# Patient Record
Sex: MALE | Race: OTHER | NOT HISPANIC OR LATINO
[De-identification: names, ages, dates, MRNs, and addresses within clinical notes are randomized per-mention and may not be internally consistent; named-entity substitution may affect disease eponyms.]

---

## 2017-07-19 ENCOUNTER — APPOINTMENT (OUTPATIENT)
Dept: OTOLARYNGOLOGY | Facility: CLINIC | Age: 67
End: 2017-07-19

## 2017-07-19 VITALS — SYSTOLIC BLOOD PRESSURE: 136 MMHG | HEART RATE: 75 BPM | DIASTOLIC BLOOD PRESSURE: 88 MMHG | OXYGEN SATURATION: 97 %

## 2017-07-19 RX ORDER — METOPROLOL SUCCINATE 50 MG/1
50 TABLET, EXTENDED RELEASE ORAL
Qty: 30 | Refills: 0 | Status: ACTIVE | COMMUNITY
Start: 2017-01-06

## 2018-05-23 ENCOUNTER — APPOINTMENT (OUTPATIENT)
Dept: OTOLARYNGOLOGY | Facility: CLINIC | Age: 68
End: 2018-05-23
Payer: COMMERCIAL

## 2018-05-23 VITALS
OXYGEN SATURATION: 99 % | HEART RATE: 67 BPM | TEMPERATURE: 98.5 F | DIASTOLIC BLOOD PRESSURE: 81 MMHG | RESPIRATION RATE: 15 BRPM | SYSTOLIC BLOOD PRESSURE: 174 MMHG

## 2018-05-23 PROCEDURE — 92557 COMPREHENSIVE HEARING TEST: CPT

## 2018-05-23 PROCEDURE — 92567 TYMPANOMETRY: CPT

## 2018-05-23 PROCEDURE — 99213 OFFICE O/P EST LOW 20 MIN: CPT | Mod: 25

## 2018-05-23 PROCEDURE — 69210 REMOVE IMPACTED EAR WAX UNI: CPT

## 2018-08-08 ENCOUNTER — APPOINTMENT (OUTPATIENT)
Dept: OTOLARYNGOLOGY | Facility: CLINIC | Age: 68
End: 2018-08-08
Payer: COMMERCIAL

## 2018-08-08 VITALS
RESPIRATION RATE: 17 BRPM | TEMPERATURE: 97.8 F | OXYGEN SATURATION: 99 % | SYSTOLIC BLOOD PRESSURE: 122 MMHG | DIASTOLIC BLOOD PRESSURE: 78 MMHG | HEART RATE: 85 BPM

## 2018-08-08 DIAGNOSIS — H81.43 VERTIGO OF CENTRAL ORIGIN, BILATERAL: ICD-10-CM

## 2018-08-08 PROCEDURE — 99214 OFFICE O/P EST MOD 30 MIN: CPT

## 2018-08-08 PROCEDURE — 92557 COMPREHENSIVE HEARING TEST: CPT

## 2018-08-08 PROCEDURE — 92550 TYMPANOMETRY & REFLEX THRESH: CPT

## 2018-11-27 ENCOUNTER — APPOINTMENT (OUTPATIENT)
Dept: ORTHOPEDIC SURGERY | Facility: CLINIC | Age: 68
End: 2018-11-27
Payer: COMMERCIAL

## 2018-11-27 ENCOUNTER — OTHER (OUTPATIENT)
Age: 68
End: 2018-11-27

## 2018-11-27 DIAGNOSIS — M41.9 SCOLIOSIS, UNSPECIFIED: ICD-10-CM

## 2018-11-27 PROCEDURE — 99214 OFFICE O/P EST MOD 30 MIN: CPT

## 2018-11-27 PROCEDURE — 73030 X-RAY EXAM OF SHOULDER: CPT | Mod: LT

## 2018-11-27 PROCEDURE — 72050 X-RAY EXAM NECK SPINE 4/5VWS: CPT

## 2018-11-27 RX ORDER — POLYETHYLENE GLYCOL 3350 17 G/17G
17 POWDER, FOR SOLUTION ORAL
Qty: 255 | Refills: 0 | Status: COMPLETED | COMMUNITY
Start: 2018-11-21

## 2019-07-22 ENCOUNTER — APPOINTMENT (OUTPATIENT)
Dept: OTOLARYNGOLOGY | Facility: CLINIC | Age: 69
End: 2019-07-22
Payer: COMMERCIAL

## 2019-07-22 VITALS
TEMPERATURE: 99.4 F | SYSTOLIC BLOOD PRESSURE: 104 MMHG | HEART RATE: 71 BPM | OXYGEN SATURATION: 96 % | DIASTOLIC BLOOD PRESSURE: 66 MMHG

## 2019-07-22 PROCEDURE — 69210 REMOVE IMPACTED EAR WAX UNI: CPT

## 2019-07-22 PROCEDURE — 92550 TYMPANOMETRY & REFLEX THRESH: CPT

## 2019-07-22 PROCEDURE — 99213 OFFICE O/P EST LOW 20 MIN: CPT | Mod: 25

## 2019-07-22 PROCEDURE — 92557 COMPREHENSIVE HEARING TEST: CPT

## 2019-07-22 NOTE — PROCEDURE
[Cerumen Impaction] : Cerumen Impaction [Same] : same as the Pre Op Dx. [] : Removal of Cerumen [FreeTextEntry6] : b copious cerumen impaction removed atraumatically with suction

## 2019-07-22 NOTE — HISTORY OF PRESENT ILLNESS
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 no pain or drainage. no trauma or noise exposure. nonsmoker. he feels his hl is moderate in severity and has had it for years. no fh relevant to cc.

## 2019-07-22 NOTE — PHYSICAL EXAM
[de-identified] : b copious cerumen impaction removed atraumatically with suction [Normal] : no masses and lesions seen, face is symmetric

## 2020-10-21 ENCOUNTER — APPOINTMENT (OUTPATIENT)
Dept: OTOLARYNGOLOGY | Facility: CLINIC | Age: 70
End: 2020-10-21
Payer: COMMERCIAL

## 2020-10-21 VITALS
HEART RATE: 73 BPM | SYSTOLIC BLOOD PRESSURE: 124 MMHG | OXYGEN SATURATION: 96 % | DIASTOLIC BLOOD PRESSURE: 78 MMHG | TEMPERATURE: 98 F

## 2020-10-21 PROCEDURE — 99213 OFFICE O/P EST LOW 20 MIN: CPT | Mod: 25

## 2020-10-21 PROCEDURE — 92557 COMPREHENSIVE HEARING TEST: CPT

## 2020-10-21 PROCEDURE — 92550 TYMPANOMETRY & REFLEX THRESH: CPT

## 2020-10-21 PROCEDURE — G0268 REMOVAL OF IMPACTED WAX MD: CPT

## 2020-10-21 PROCEDURE — 69210 REMOVE IMPACTED EAR WAX UNI: CPT

## 2020-10-21 NOTE — PHYSICAL EXAM
[de-identified] : b copious cerumen impaction removed atraumatically with suction [Normal] : no rashes [de-identified] : gait steady

## 2020-10-21 NOTE — HISTORY OF PRESENT ILLNESS
[de-identified] : 69 yo man with h/o b snhl wishes to have his hearing checked as it has been over a year since he has been here. He reports that he feels as if his hearing has worsened slightly. denies tinnitus or vertigo. About to have cataract surgery. He feels his hl has decreased mildly and is not a current ha user.

## 2021-05-10 ENCOUNTER — APPOINTMENT (OUTPATIENT)
Dept: ORTHOPEDIC SURGERY | Facility: CLINIC | Age: 71
End: 2021-05-10
Payer: MEDICARE

## 2021-05-10 VITALS
BODY MASS INDEX: 28.92 KG/M2 | SYSTOLIC BLOOD PRESSURE: 123 MMHG | WEIGHT: 202 LBS | HEIGHT: 70 IN | DIASTOLIC BLOOD PRESSURE: 77 MMHG

## 2021-05-10 DIAGNOSIS — M48.10 ANKYLOSING HYPEROSTOSIS [FORESTIER], SITE UNSPECIFIED: ICD-10-CM

## 2021-05-10 PROCEDURE — 99072 ADDL SUPL MATRL&STAF TM PHE: CPT

## 2021-05-10 PROCEDURE — 99215 OFFICE O/P EST HI 40 MIN: CPT

## 2021-05-10 PROCEDURE — 73630 X-RAY EXAM OF FOOT: CPT | Mod: LT

## 2021-05-10 NOTE — ASSESSMENT
[FreeTextEntry1] : 71-year-old gentleman with left shoulder and left ankle pain.  In his shoulder by report it states that there is mild osteoarthritis glenohumeral joint.  No loss of motion found on exam seemed greater than I would expect if he just had mild arthritis.  I would like to see the x-rays to see if the arthritis looks more advanced than reported.  Otherwise he may have other reasons for the stiffness.  We may consider a steroid injection, physical therapy or further evaluation with an MRI.  His rotator cuff strength was good however so I did not  suspect a significant tear.\par In his ankle I think he may have a chronic anterior tibial tendon rupture.  These can occur with degeneration.  This is causing him to compensate with the EHL which is why his hallux is in dorsiflexion when walking.  He actually walks relatively well.  A large part of his gait issue is more stiffness and what looks consistent with Parkinson's perhaps.  He is going to be seeing a neurologist to rule out Parkinson's.\par I referred him for an MRI of the left ankle to confirm if there is a rupture of the tendon and to assess in further detail.  In the meantime he can start some physical therapy to work on strengthening the foot and ankle and work on his gait.\par I spoke to him about operative and nonoperative treatment for chronic anterior tibial tendon ruptures.  Some patients with a function well and walk well do not need to do surgery.  An ankle-foot orthosis may be helpful to hold the ankle in dorsiflexion so he will not have to compensate with the EHL.  Surgery would involve the tendon transfer versus tendon graft and I would work more as a check rein then to get back good power anterior tibial tendon\par He certainly should see the neurologist first before thinking about any surgery just to clarify what else may be affecting his gait.\par Follow-up in a few weeks and at that time he will also bring in copies of the shoulder and ankle x-rays that were done last week.

## 2021-05-10 NOTE — HISTORY OF PRESENT ILLNESS
[de-identified] : Mr. Connors comes in for f/u fo his LEFT shoulder. I last saw him for LEFT shoulder 2 1/2 yrs ago when it became painful  without any specific injury or inciting event. He has pain and stiffness. \par He states that the shoulder pain is about 6 out of 10 worse lifting his arm or moving it but also hurts at rest.  He had done physical therapy a couple years ago but nothing recently.  He did go for a recent x-ray showing mild osteoarthritis of both glenohumeral joints.   His shoulder pain is getting progressively worse.\par He complains of pain and swelling in the left ankle going on for at least a year.  Sometimes it feels numb.  He has noticed that his hallux sticks up and does not flexes easily.\par Started walking and is worse sitting about 6-7 out of 10 in severity.

## 2021-05-10 NOTE — REASON FOR VISIT
[Follow-Up Visit] : a follow-up visit for [Shoulder Pain] : shoulder pain [FreeTextEntry2] : ankle pain

## 2021-05-10 NOTE — PHYSICAL EXAM
[Slightly Antalgic] : slightly antalgic [Shuffling] : shuffling [Normal RUE] : Right Upper Extremity: No scars, rashes, lesions, ulcers, skin intact [Normal LUE] : Left Upper Extremity: No scars, rashes, lesions, ulcers, skin intact [Normal RLE] : Right Lower Extremity: No scars, rashes, lesions, ulcers, skin intact [Normal LLE] : Left Lower Extremity: No scars, rashes, lesions, ulcers, skin intact [Normal Touch] : sensation intact for touch [Normal] : No swelling, no edema, normal pedal pulses and normal temperature [de-identified] : Shoulders\par Mildly tender glenohumeral joint left shoulder.\par Forward elevation actively and passively is to about 110 to 120 degrees and internal rotation to L5 and about 30 degrees external rotation with his arm at the side.\par Rotator cuff strength is good: 5/5 supraspinatus, internal rotation and external rotation.  5/5 biceps.\par Pain with Neer and Feliciano.\par \par Left foot and ankle\par He does walk with a shuffling gait with stiffness and start up difficulty.  He has other changes that look consistent with Parkinson's including a flat face.\par Mild edema left ankle without ecchymoses or erythema.\par On ambulation his left hallux is dorsiflexed and not touching the ground as it is on the right side.\par Anterior tibial tendon is not palpable over the left anterior ankle and there is tenderness in this region.\par Ankle dorsiflexion strength is about 4+ out of 5 due to compensation with the toe extensors.\par Ankle range of motion is with 5 degrees of dorsiflexion and 25 to 30 degrees plantarflexion.\par Sensation is intact throughout.  Foot is warm with normal capillary refill. [de-identified] : \par X-rays taking at [facility] of bilateral ankles AP and lateral views on May 6, 2021 showed moderate plantar calcaneal spur on the left and unremarkable right\par \par Report on x-rays of bilateral shoulders 2 views AP and axillary taken May 6, 2021 showed mild glenohumeral and AC joint arthritis in both shoulders.  X-rays of the\par \par Left foot weightbearing 3 views taken today show plantar heel spur and accessory navicular.  No acute changes.

## 2021-06-02 ENCOUNTER — APPOINTMENT (OUTPATIENT)
Dept: ORTHOPEDIC SURGERY | Facility: CLINIC | Age: 71
End: 2021-06-02
Payer: MEDICARE

## 2021-06-02 DIAGNOSIS — M19.012 PRIMARY OSTEOARTHRITIS, LEFT SHOULDER: ICD-10-CM

## 2021-06-02 DIAGNOSIS — R29.898 OTHER SYMPTOMS AND SIGNS INVOLVING THE MUSCULOSKELETAL SYSTEM: ICD-10-CM

## 2021-06-02 DIAGNOSIS — M54.2 CERVICALGIA: ICD-10-CM

## 2021-06-02 PROCEDURE — 99215 OFFICE O/P EST HI 40 MIN: CPT

## 2021-06-02 NOTE — REVIEW OF SYSTEMS
[Joint Pain] : joint pain [Joint Stiffness] : joint stiffness [Feeling Weak] : feeling weak [Muscle Weakness] : muscle weakness

## 2021-06-02 NOTE — PHYSICAL EXAM
[Slightly Antalgic] : slightly antalgic [Normal RUE] : Right Upper Extremity: No scars, rashes, lesions, ulcers, skin intact [Shuffling] : shuffling [Normal LUE] : Left Upper Extremity: No scars, rashes, lesions, ulcers, skin intact [Normal RLE] : Right Lower Extremity: No scars, rashes, lesions, ulcers, skin intact [Normal LLE] : Left Lower Extremity: No scars, rashes, lesions, ulcers, skin intact [Normal Touch] : sensation intact for touch [Normal] : No swelling, no edema, normal pedal pulses and normal temperature [de-identified] : Cervical spine\par Stiffness with range of motion.  Forward projection of the head neck.  Pain with extension.\par Tender in the cervical spine.\par \par Left foot and ankle\par He walks with a shuffling gait with stiffness and start up difficulty.  Rigidity\par Mild edema left ankle without ecchymoses or erythema.\par On ambulation his left hallux is dorsiflexed and not touching the ground as it is on the right side.\par Anterior tibial tendon is not palpable over the left anterior ankle and there is tenderness in this region.\par Ankle dorsiflexion strength is about 4+ out of 5 due to compensation with the toe extensors.\par Ankle range of motion is with 5 degrees of dorsiflexion and 25 to 30 degrees plantarflexion.\par Sensation is intact throughout.  Foot is warm with normal capillary refill. [de-identified] : \par X-rays taking at [facility] of bilateral ankles AP and lateral views on May 6, 2021 showed moderate plantar calcaneal spur on the left and unremarkable right\par \par Report on x-rays of bilateral shoulders 2 views AP and axillary taken May 6, 2021 showed mild glenohumeral and AC joint arthritis in both shoulders.  X-rays of the\par \par Left foot weightbearing 3 views taken 5/10/21 showed plantar heel spur and accessory navicular.  No acute changes.\par \par MRI left ankle 5/18/21 showed intact Anterior Tibial Tendon, decreased muscle bulk distal calf and ankle mm and extensive ankle edema.\par \par Limited left ankle ultrasound was done today with comparison to the right and the anterior tibial tendon is visualized although somewhat attenuated compared to the right side\par \par MRI cervical spine was also performed May 18, 2021 showing multilevel degenerative changes most significant at C5-C6 with mild to moderate central stenosis and moderate left and severe right neural foraminal narrowing.  At other levels there is also moderate to severe foraminal narrowing.\par \par MRI of the lumbar spine performed May 18, 2021 also shows multilevel degenerative changes and at L4-L5 there is mild to moderate right and moderate left foraminal narrowing and there is a an L2-L3 disc bulge contacting the right greater than left L3 nerve roots and mild to moderate right neural foraminal stenosis.

## 2021-06-02 NOTE — CONSULT LETTER
[Dear  ___] : Dear  [unfilled], [Consult Letter:] : I had the pleasure of evaluating your patient, [unfilled]. [Please see my note below.] : Please see my note below. [Consult Closing:] : Thank you very much for allowing me to participate in the care of this patient.  If you have any questions, please do not hesitate to contact me. [Sincerely,] : Sincerely, [FreeTextEntry3] : Renee Herrera MD

## 2021-06-02 NOTE — HISTORY OF PRESENT ILLNESS
[de-identified] : Mr. Connors comes in for f/u for his LEFT ankle where he has weakness and swelling.  I thought on exam last visit that he might have an anterior tibial tendon chronic rupture because the tendon was not palpated well on exam.  The MRI shows the intact tendon is intact however but there is significant loss of muscle bulk in the ankle and lower leg.\par He also had MRIs of the cervical and lumbar spines. He is seeing a neurologist next week.\par He has a lot of neck stiffness and neck pain without radiculopathy.

## 2021-06-02 NOTE — ASSESSMENT
[FreeTextEntry1] : 71-year-old gentleman with left shoulder, Cervical  and left ankle pain.  He has multilevel cervical spondylosis greatest at C5-C6 with moderate to severe foraminal narrowing.\par He was given a new prescription for physical therapy to work on his cervical spine and in addition to his gait and left ankle weakness.\par It looks like the anterior tibial tendon is not ruptured but he does seem to have significant weakness in his left foot and ankle compared to the right side.  He is seeing the neurologist next week who will hopefully be able to shed some light on what is going on neurologically.  He has a lot of features that look consistent with Parkinson's.\par Heat and ice as needed.  Tylenol as needed.  He cannot take NSAIDs.\par Follow-up in about 5 weeks.

## 2021-07-07 ENCOUNTER — APPOINTMENT (OUTPATIENT)
Dept: ORTHOPEDIC SURGERY | Facility: CLINIC | Age: 71
End: 2021-07-07
Payer: MEDICARE

## 2021-07-07 DIAGNOSIS — G89.29 PAIN IN LEFT ANKLE AND JOINTS OF LEFT FOOT: ICD-10-CM

## 2021-07-07 DIAGNOSIS — M47.812 SPONDYLOSIS W/OUT MYELOPATHY OR RADICULOPATHY, CERVICAL REGION: ICD-10-CM

## 2021-07-07 DIAGNOSIS — M25.572 PAIN IN LEFT ANKLE AND JOINTS OF LEFT FOOT: ICD-10-CM

## 2021-07-07 PROCEDURE — 99214 OFFICE O/P EST MOD 30 MIN: CPT

## 2021-07-07 PROCEDURE — 73564 X-RAY EXAM KNEE 4 OR MORE: CPT | Mod: 50

## 2021-07-07 PROCEDURE — 72050 X-RAY EXAM NECK SPINE 4/5VWS: CPT

## 2021-07-07 RX ORDER — HYDROCODONE BITARTRATE AND ACETAMINOPHEN 5; 325 MG/1; MG/1
5-325 TABLET ORAL
Qty: 20 | Refills: 0 | Status: COMPLETED | COMMUNITY
Start: 2017-07-07 | End: 2021-07-07

## 2021-07-07 RX ORDER — SULFAMETHOXAZOLE AND TRIMETHOPRIM 800; 160 MG/1; MG/1
800-160 TABLET ORAL
Qty: 6 | Refills: 0 | Status: COMPLETED | COMMUNITY
Start: 2021-03-11

## 2021-07-07 NOTE — PHYSICAL EXAM
[Slightly Antalgic] : slightly antalgic [Shuffling] : shuffling [Normal RUE] : Right Upper Extremity: No scars, rashes, lesions, ulcers, skin intact [Normal LUE] : Left Upper Extremity: No scars, rashes, lesions, ulcers, skin intact [Normal RLE] : Right Lower Extremity: No scars, rashes, lesions, ulcers, skin intact [Normal LLE] : Left Lower Extremity: No scars, rashes, lesions, ulcers, skin intact [Normal Touch] : sensation intact for touch [Normal] : No swelling, no edema, normal pedal pulses and normal temperature [de-identified] : Cervical spine\par Stiffness with range of motion.  Forward projection of the head neck.  Pain with extension.  Scoliosis.\par Tender in the cervical spine.\par \par Left foot and ankle\par He is moving and walking somewhat better than last visit in regards to the possible Parkinson's.\par Mild edema left ankle without ecchymoses or erythema.\par On ambulation his left hallux is dorsiflexed and not touching the ground as it is on the right side.\par Anterior tibial tendon is not palpable over the left anterior ankle and there is tenderness in this region.\par Ankle dorsiflexion strength is about 4+ out of 5 due to compensation with the toe extensors.\par Ankle range of motion is with 5 degrees of dorsiflexion and 25 to 30 degrees plantarflexion.\par Sensation is intact throughout.  Foot is warm with normal capillary refill.\par \par Knees\par Mildly tender patella facets.\par Knee range of motion is 0 to about 125 to 130 degrees flexion with patellofemoral crepitus bilateral knees left greater than right.\par No significant joint line tenderness medially or laterally.  Normal varus and valgus and AP stability.  Ia Lachman.  Negative Karie. [de-identified] : \par X-rays taking at [facility] of bilateral ankles AP and lateral views on May 6, 2021 showed moderate plantar calcaneal spur on the left and unremarkable right\par \par Report on x-rays of bilateral shoulders 2 views AP and axillary taken May 6, 2021 showed mild glenohumeral and AC joint arthritis in both shoulders.  X-rays of the\par \par Left foot weightbearing 3 views taken 5/10/21 showed plantar heel spur and accessory navicular.  No acute changes.\par \par MRI left ankle 5/18/21 showed intact Anterior Tibial Tendon, decreased muscle bulk distal calf and ankle mm and extensive ankle edema.\par \par MRI cervical spine performed May 18, 2018 showing multilevel degenerative changes most significant at C5-C6 with mild to moderate central stenosis and moderate left and severe right neural foraminal narrowing.  At other levels there is also moderate to severe foraminal narrowing.\par \par MRI of the lumbar spine performed May 18, 2018 also shows multilevel degenerative changes and at L4-L5 there is mild to moderate right and moderate left foraminal narrowing and there is a an L2-L3 disc bulge contacting the right greater than left L3 nerve roots and mild to moderate right neural foraminal stenosis.\par \par X-rays of the cervical spine AP, lateral and oblique views today show multilevel degenerative changes.  Facet arthrosis and foraminal narrowing present.  Forward projection of cervical spine\par \par X-rays bilateral knees weightbearing AP, lateral, merchant and 45 degree PA flexed views ordered and performed today show moderate degenerative changes with osteophytes and narrowing at the patellofemoral joints.  There may be mild medial and lateral compartment narrowing.

## 2021-07-07 NOTE — REASON FOR VISIT
[Follow-Up Visit] : a follow-up visit for [Knee Pain] : knee pain [FreeTextEntry2] : neck and ankle pain

## 2021-07-07 NOTE — HISTORY OF PRESENT ILLNESS
[de-identified] : Mr. Connors comes in for f/u for his LEFT ankle where he has weakness and swelling.  \par He started PT for the ankle.  He also saw the neurologist and he thinks that he likely has Parkinson's and he was started on carbidopa levodopa just last week.  He thinks it may be helping and he feels a little better but it does make him tired.\par He has pain in his knees and neck as well that he would like evaluated. \par Knees hurt worse on the left than right worse with walking and stairs and better at rest.  No injury.\par He has constant neck pain without radiation or numbness or tingling.  There is a lot of stiffness and it is hard for him to extend his neck.

## 2021-07-07 NOTE — ASSESSMENT
[FreeTextEntry1] : 71-year-old gentleman with left shoulder, Cervical  and left ankle pain.  He has multilevel cervical spondylosis greatest at C5-C6 with moderate to severe foraminal narrowing.  The MRI did show stenosis in the spine.  He does not seem to have any specific neurologic deficits but this may contribute to his gait issues.  He was diagnosed with possible Parkinson's and is now on Parkinson's medication for 1 week and is feeling somewhat better.  He is going to be following up with his neurologist in another week or so.\par He has knee arthritis and we did talk about a possible steroid injection or hyaluronic acid injections.  He would like to try the physical therapy first which was prescribed working on strengthening the knees and working on the gait.  Heat and ice as needed.  He cannot take NSAIDs because he is on a blood thinner but can take Tylenol and has used some medical marijuana and can use other over-the-counter topical medications if they are helpful.  If he is not getting relief then we can consider injections.  A steroid injection may cause his glucose to elevate.\par It looks like the anterior tibial tendon is not ruptured but he does seem to have weakness in his left foot and ankle compared to the right side.  He should continue with the therapy working on the gait and strengthening.\par At some point see a spine surgeon or a neurologist regarding the cervical and lumbar spine issues may be helpful.  We may want to get new MRIs as well.\par Follow-up in about 6 weeks

## 2021-08-04 ENCOUNTER — OUTPATIENT (OUTPATIENT)
Dept: OUTPATIENT SERVICES | Facility: HOSPITAL | Age: 71
LOS: 1 days | End: 2021-08-04

## 2021-08-04 ENCOUNTER — APPOINTMENT (OUTPATIENT)
Dept: ORTHOPEDIC SURGERY | Facility: CLINIC | Age: 71
End: 2021-08-04
Payer: MEDICARE

## 2021-08-04 ENCOUNTER — APPOINTMENT (OUTPATIENT)
Dept: OTOLARYNGOLOGY | Facility: CLINIC | Age: 71
End: 2021-08-04
Payer: MEDICARE

## 2021-08-04 VITALS
HEART RATE: 66 BPM | OXYGEN SATURATION: 100 % | SYSTOLIC BLOOD PRESSURE: 133 MMHG | BODY MASS INDEX: 28.63 KG/M2 | DIASTOLIC BLOOD PRESSURE: 80 MMHG | WEIGHT: 200 LBS | TEMPERATURE: 97.8 F | HEIGHT: 70 IN

## 2021-08-04 DIAGNOSIS — M50.220 OTHER CERVICAL DISC DISPLACEMENT, MID-CERVICAL REGION, UNSPECIFIED LEVEL: ICD-10-CM

## 2021-08-04 DIAGNOSIS — M48.02 SPINAL STENOSIS, CERVICAL REGION: ICD-10-CM

## 2021-08-04 DIAGNOSIS — M79.662 PAIN IN LEFT LOWER LEG: ICD-10-CM

## 2021-08-04 DIAGNOSIS — M79.10 MYALGIA, UNSPECIFIED SITE: ICD-10-CM

## 2021-08-04 DIAGNOSIS — M75.41 IMPINGEMENT SYNDROME OF RIGHT SHOULDER: ICD-10-CM

## 2021-08-04 DIAGNOSIS — M67.912 UNSPECIFIED DISORDER OF SYNOVIUM AND TENDON, LEFT SHOULDER: ICD-10-CM

## 2021-08-04 DIAGNOSIS — M17.0 BILATERAL PRIMARY OSTEOARTHRITIS OF KNEE: ICD-10-CM

## 2021-08-04 PROCEDURE — 69210 REMOVE IMPACTED EAR WAX UNI: CPT

## 2021-08-04 PROCEDURE — 73030 X-RAY EXAM OF SHOULDER: CPT | Mod: 50

## 2021-08-04 PROCEDURE — 99214 OFFICE O/P EST MOD 30 MIN: CPT | Mod: 25

## 2021-08-04 PROCEDURE — 99214 OFFICE O/P EST MOD 30 MIN: CPT

## 2021-08-04 PROCEDURE — 73590 X-RAY EXAM OF LOWER LEG: CPT | Mod: LT

## 2021-08-04 NOTE — REASON FOR VISIT
[Follow-Up Visit] : a follow-up visit for [Shoulder Pain] : shoulder pain [Knee Pain] : knee pain [FreeTextEntry2] : Neck pain, back pain, leg pain

## 2021-08-04 NOTE — PHYSICAL EXAM
[Slightly Antalgic] : slightly antalgic [Shuffling] : shuffling [Normal RUE] : Right Upper Extremity: No scars, rashes, lesions, ulcers, skin intact [Normal LUE] : Left Upper Extremity: No scars, rashes, lesions, ulcers, skin intact [Normal RLE] : Right Lower Extremity: No scars, rashes, lesions, ulcers, skin intact [Normal LLE] : Left Lower Extremity: No scars, rashes, lesions, ulcers, skin intact [Normal Touch] : sensation intact for touch [Normal] : No swelling, no edema, normal pedal pulses and normal temperature [de-identified] : Cervical spine\par Stiffness with range of motion.  Forward projection of the head neck.  Pain with extension.  Scoliosis.\par Tender in the cervical spine.\par \par Left foot and ankle\par He is moving and walking somewhat better than last visit in regards to the possible Parkinson's.\par Mild edema left ankle without ecchymoses or erythema.\par On ambulation his left hallux is dorsiflexed and not touching the ground as it is on the right side.\par Anterior tibial tendon is not palpable over the left anterior ankle and there is tenderness in this region.\par Ankle dorsiflexion strength is about 4+ out of 5 due to compensation with the toe extensors.\par Ankle range of motion is with 5 degrees of dorsiflexion and 25 to 30 degrees plantarflexion.\par Sensation is intact throughout.  Foot is warm with normal capillary refill.\par \par Knees\par Mildly tender patella facets.\par Knee range of motion is 0 to about 125 to 130 degrees flexion with patellofemoral crepitus bilateral knees left greater than right.\par No significant joint line tenderness medially or laterally.  Normal varus and valgus and AP stability.  Ia Lachman.  Negative Karie. [de-identified] : \par X-rays left tibia and fibula taken today show some irregularity of the cortex more along the fibula that almost looks like melorheostosis.  I thought there may be a fracture on x-rays of the knees last visit in the proximal fibular diaphysis on the left but no distinct fracture line on today's x-rays.\par \par X-rays bilateral shoulders show normal glenohumeral joint.  No calcifications or bone lesions or any significant arthritis.\par \par X-rays taking at [facility] of bilateral ankles AP and lateral views on May 6, 2021 showed moderate plantar calcaneal spur on the left and unremarkable right\par \par Report on x-rays of bilateral shoulders 2 views AP and axillary taken May 6, 2021 showed mild glenohumeral and AC joint arthritis in both shoulders.  X-rays of the\par \par Left foot weightbearing 3 views taken 5/10/21 showed plantar heel spur and accessory navicular.  No acute changes.\par \par MRI left ankle 5/18/21 showed intact Anterior Tibial Tendon, decreased muscle bulk distal calf and ankle mm and extensive ankle edema.\par \par MRI cervical spine performed May 18, 2018 showing multilevel degenerative changes most significant at C5-C6 with mild to moderate central stenosis and moderate left and severe right neural foraminal narrowing.  There is a disc protrusion at this level.  At other levels there is also moderate to severe foraminal narrowing.\par \par MRI of the lumbar spine performed May 18, 2018 also shows multilevel degenerative changes and at L4-L5 there is mild to moderate right and moderate left foraminal narrowing and there is a an L2-L3 disc bulge contacting the right greater than left L3 nerve roots and mild to moderate right neural foraminal stenosis.\par \par X-rays of the cervical spine AP, lateral and oblique views 7/7/21 showed multilevel degenerative changes.  Facet arthrosis and foraminal narrowing present.  Forward projection of cervical spine\par \par X-rays bilateral knees weightbearing AP, lateral, merchant and 45 degree PA flexed views ordered and performed today show moderate degenerative changes with osteophytes and narrowing at the patellofemoral joints.  There may be mild medial and lateral compartment narrowing.

## 2021-08-04 NOTE — HISTORY OF PRESENT ILLNESS
[de-identified] : followup 72 yo man with h/ob snhl and is now c/o dizziness. He has significant cardiac issues but says he has undergone workup and it is fine now. No inciting event for dizziness. Denies trauma. He is a nonsmoker. It is most noticeable when he turns. Has mask-like face which is new.

## 2021-08-04 NOTE — HISTORY OF PRESENT ILLNESS
[de-identified] : Mr. Connors comes in for f/u for his LEFT ankle, Knees and C spine.  He is also having pain and stiffness in his shoulders.\par He is being treated for the Parkinson's.  He also had seen a rheumatologist and was diagnosed with DISH.\par He has a lot of generalized stiffness and pains in many different joints.\par He has pain in his shoulders as well as stiffness.  No injury to either shoulder.  His neck is still sore.\par He has been doing some therapy working on the gait and strengthening in his legs.  He does feel some pain in the left greater than right leg.  His knees feel stiff and sore as well.\par Sometimes he feels numbness down his legs\par \par Knees hurt worse on the left than right worse with walking and stairs and better at rest.  No injury.\par He has constant neck pain without radiation or numbness or tingling.  There is a lot of stiffness and it is hard for him to extend his neck.

## 2021-08-04 NOTE — ASSESSMENT
[FreeTextEntry1] : cerumen removed\par tms normal\par \par vertigo\par could not induce in office\par rtc with  vng mri doppler

## 2021-08-04 NOTE — PHYSICAL EXAM
[de-identified] : b copious cerumen impaction removed atraumatically with suction [] : Sunray-Hallpike test is negative [Normal] : no rashes [de-identified] : gait steady

## 2021-08-04 NOTE — ASSESSMENT
[FreeTextEntry1] : 71-year-old gentleman with multiple joint issues.  This is all complicated by his diagnosis of Parkinson's which is causing a lot of rigidity and stiffness.  He has a lot of stiffness in his shoulders to the point that I thought he might have arthritis but no arthritis is seen on his x-rays.  It is also possible he could have something like polymyalgia rheumatica so I sent him for sed rate and C-reactive protein.  I will call him with the results.\par I think he should see a spine specialist as well.  He has multilevel cervical spondylosis greatest at C5-C6 with moderate to severe foraminal narrowing.  The MRI did show stenosis in the spine.  He does not seem to have any specific neurologic deficits but this may contribute to his gait issues.  \par He has mild  knee arthritis and we did talk about a possible steroid injection or hyaluronic acid injections.   \par He has some changes and pain in the left lower leg and an MRI of his left tibia and fibula were ordered.\par He cannot take NSAIDs because he is on blood thinners and he is not a good candidate for steroids because of his diabetes.  He states that his sugars are not well controlled recently.  I had thought about putting him on low-dose prednisone but was concerned about the glucose levels going up.  I will call him with the results of the blood tests and decide on further treatment.  He does have a rheumatologist to I may want him to see again as well.

## 2021-08-06 LAB
CRP SERPL-MCNC: 7 MG/L
ERYTHROCYTE [SEDIMENTATION RATE] IN BLOOD BY WESTERGREN METHOD: 9 MM/HR

## 2021-08-09 ENCOUNTER — NON-APPOINTMENT (OUTPATIENT)
Age: 71
End: 2021-08-09

## 2021-08-12 ENCOUNTER — APPOINTMENT (OUTPATIENT)
Dept: OTOLARYNGOLOGY | Facility: CLINIC | Age: 71
End: 2021-08-12
Payer: MEDICARE

## 2021-08-12 VITALS
DIASTOLIC BLOOD PRESSURE: 70 MMHG | SYSTOLIC BLOOD PRESSURE: 115 MMHG | TEMPERATURE: 97.9 F | HEART RATE: 70 BPM | OXYGEN SATURATION: 98 %

## 2021-08-12 DIAGNOSIS — J31.0 CHRONIC RHINITIS: ICD-10-CM

## 2021-08-12 DIAGNOSIS — R13.14 DYSPHAGIA, PHARYNGOESOPHAGEAL PHASE: ICD-10-CM

## 2021-08-12 DIAGNOSIS — H90.3 SENSORINEURAL HEARING LOSS, BILATERAL: ICD-10-CM

## 2021-08-12 DIAGNOSIS — R42 DIZZINESS AND GIDDINESS: ICD-10-CM

## 2021-08-12 PROCEDURE — 92550 TYMPANOMETRY & REFLEX THRESH: CPT

## 2021-08-12 PROCEDURE — 92540 BASIC VESTIBULAR EVALUATION: CPT

## 2021-08-12 PROCEDURE — 31575 DIAGNOSTIC LARYNGOSCOPY: CPT

## 2021-08-12 PROCEDURE — 92537 CALORIC VSTBLR TEST W/REC: CPT

## 2021-08-12 PROCEDURE — 99213 OFFICE O/P EST LOW 20 MIN: CPT | Mod: 25

## 2021-08-12 PROCEDURE — 92557 COMPREHENSIVE HEARING TEST: CPT

## 2021-08-12 NOTE — DATA REVIEWED
[de-identified] : b downsloping snhl; vng posnal nystagmus and low gain reviewed with pt [de-identified] : mri sinus congestion reviewed with pt

## 2021-08-12 NOTE — REASON FOR VISIT
[Subsequent Evaluation] : a subsequent evaluation for [FreeTextEntry2] : dizziness and increased saliva

## 2021-08-12 NOTE — HISTORY OF PRESENT ILLNESS
[de-identified] : 71M who returns for follow up of dizziness. He denies any changes in symptoms and returns for work up. \par \par He also reports nasal drainage when he eats hot foods which he attributes to also having increased saliva/drooling. He denies any sore throat, dysphagia, hoarseness. He has recently been diagnosed with a neurological condition that is being treated like Parkinsons. He denies any other ENT complaints. No pertinent FH/SH.

## 2021-08-12 NOTE — HISTORY OF PRESENT ILLNESS
[de-identified] : 71M who returns for follow up of dizziness. He denies any changes in symptoms and returns for work up. \par \par He also reports nasal drainage when he eats hot foods which he attributes to also having increased saliva/drooling. He denies any sore throat, dysphagia, hoarseness. He has recently been diagnosed with a neurological condition that is being treated like Parkinsons. He denies any other ENT complaints. No pertinent FH/SH.

## 2021-08-12 NOTE — ASSESSMENT
[FreeTextEntry1] : 71M who returns for follow up of dizziness with audiogram showing unchanged bilateral SNHL, VNG showing age related imbalance. He complains also of drooling. On laryngoscopy he has normal exam. I suspect this is actually from the neurologic disorder and not from excess saliva production. He may be experiencing some dysphagia without realizing it. We recommend swallow therapy- but he declines for now- I explained it is likeely related to his neurologic disorder.\par \par Plan:\par - vestibular therapy\par - speech therapy- pt declined\par - Rx Atrovent\par hae offered - he preferred to hold off\par rc 6 mo and as needed

## 2021-08-12 NOTE — DATA REVIEWED
[de-identified] : b downsloping snhl; vng posnal nystagmus and low gain reviewed with pt [de-identified] : mri sinus congestion reviewed with pt

## 2021-08-12 NOTE — PHYSICAL EXAM
[Midline] : trachea located in midline position [Laryngoscopy Performed] : laryngoscopy was performed, see procedure section for findings [Normal] : no rashes [de-identified] : gait steady

## 2021-08-12 NOTE — PROCEDURE
[Dysphagia] : dysphagia not clearly evaluated by indirect laryngoscopy [Flexible Endoscope] : examined with the flexible endoscope [Serial Number: ___] : Serial Number: [unfilled] [Normal] : posterior cricoid area had healthy pink mucosa in the interarytenoid area and the esophageal inlet [de-identified] : Procedure was explained to patient with all risks, benefits and alternatives, all questions answered. Patient was positioned sitting upright with chest out. Patient was anesthetized with lidocaine 1% and Afrin topical intranasally. Bilateral nasal passages inspected with no erythema, edema, polyps, masses, lesions or purulent drainage. Scope was advanced via right nasal passage to posterior nasopharynx where no masses, lesions or drainage was noted. Scope was then advanced to oropharynx where no masses, lesions or obstruction was noted. Scope was then advanced to hypopharynx/larynx where no asymmetry, masses, lesions, pooled secretions, vocal cord dysmotility or stenosis was noted. Patient tolerated procedure well.\par

## 2021-08-12 NOTE — PROCEDURE
[Dysphagia] : dysphagia not clearly evaluated by indirect laryngoscopy [Flexible Endoscope] : examined with the flexible endoscope [Serial Number: ___] : Serial Number: [unfilled] [Normal] : posterior cricoid area had healthy pink mucosa in the interarytenoid area and the esophageal inlet [de-identified] : Procedure was explained to patient with all risks, benefits and alternatives, all questions answered. Patient was positioned sitting upright with chest out. Patient was anesthetized with lidocaine 1% and Afrin topical intranasally. Bilateral nasal passages inspected with no erythema, edema, polyps, masses, lesions or purulent drainage. Scope was advanced via right nasal passage to posterior nasopharynx where no masses, lesions or drainage was noted. Scope was then advanced to oropharynx where no masses, lesions or obstruction was noted. Scope was then advanced to hypopharynx/larynx where no asymmetry, masses, lesions, pooled secretions, vocal cord dysmotility or stenosis was noted. Patient tolerated procedure well.\par

## 2021-08-12 NOTE — PHYSICAL EXAM
[Midline] : trachea located in midline position [Laryngoscopy Performed] : laryngoscopy was performed, see procedure section for findings [Normal] : no rashes [de-identified] : gait steady

## 2023-09-14 ENCOUNTER — APPOINTMENT (OUTPATIENT)
Dept: OTOLARYNGOLOGY | Facility: CLINIC | Age: 73
End: 2023-09-14
Payer: MEDICARE

## 2023-09-14 VITALS
DIASTOLIC BLOOD PRESSURE: 71 MMHG | HEART RATE: 57 BPM | BODY MASS INDEX: 26.48 KG/M2 | HEIGHT: 70 IN | TEMPERATURE: 97.8 F | OXYGEN SATURATION: 99 % | WEIGHT: 185 LBS | SYSTOLIC BLOOD PRESSURE: 125 MMHG

## 2023-09-14 DIAGNOSIS — H90.3 SENSORINEURAL HEARING LOSS, BILATERAL: ICD-10-CM

## 2023-09-14 DIAGNOSIS — H61.23 IMPACTED CERUMEN, BILATERAL: ICD-10-CM

## 2023-09-14 PROCEDURE — 31575 DIAGNOSTIC LARYNGOSCOPY: CPT

## 2023-09-14 PROCEDURE — G0268 REMOVAL OF IMPACTED WAX MD: CPT

## 2023-09-14 PROCEDURE — 92557 COMPREHENSIVE HEARING TEST: CPT

## 2023-09-14 PROCEDURE — 99214 OFFICE O/P EST MOD 30 MIN: CPT | Mod: 25

## 2023-09-14 PROCEDURE — 92550 TYMPANOMETRY & REFLEX THRESH: CPT | Mod: 52

## 2023-09-29 ENCOUNTER — APPOINTMENT (OUTPATIENT)
Dept: NEUROLOGY | Facility: CLINIC | Age: 73
End: 2023-09-29
Payer: MEDICARE

## 2023-09-29 VITALS
HEART RATE: 57 BPM | DIASTOLIC BLOOD PRESSURE: 74 MMHG | HEIGHT: 70 IN | SYSTOLIC BLOOD PRESSURE: 127 MMHG | BODY MASS INDEX: 26.48 KG/M2 | OXYGEN SATURATION: 98 % | WEIGHT: 185 LBS

## 2023-09-29 VITALS — OXYGEN SATURATION: 100 % | DIASTOLIC BLOOD PRESSURE: 71 MMHG | HEART RATE: 58 BPM | SYSTOLIC BLOOD PRESSURE: 117 MMHG

## 2023-09-29 PROCEDURE — 99205 OFFICE O/P NEW HI 60 MIN: CPT

## 2023-09-29 RX ORDER — ROSUVASTATIN CALCIUM 5 MG/1
5 TABLET, FILM COATED ORAL
Qty: 22 | Refills: 0 | Status: DISCONTINUED | COMMUNITY
Start: 2016-12-22 | End: 2023-09-29

## 2023-09-29 RX ORDER — LISINOPRIL 20 MG/1
20 TABLET ORAL DAILY
Refills: 0 | Status: ACTIVE | COMMUNITY

## 2023-09-29 RX ORDER — PANTOPRAZOLE 40 MG/1
40 TABLET, DELAYED RELEASE ORAL DAILY
Refills: 0 | Status: ACTIVE | COMMUNITY
Start: 2018-08-28

## 2023-09-29 RX ORDER — OMEPRAZOLE 20 MG/1
20 CAPSULE, DELAYED RELEASE ORAL
Qty: 30 | Refills: 0 | Status: DISCONTINUED | COMMUNITY
Start: 2017-06-08 | End: 2023-09-29

## 2023-09-29 RX ORDER — LACTOBACILLUS ACIDOPHILUS/PECT 30 MG-20MG
TABLET ORAL DAILY
Refills: 0 | Status: ACTIVE | COMMUNITY

## 2023-09-29 RX ORDER — METFORMIN ER 500 MG 500 MG/1
500 TABLET ORAL
Refills: 0 | Status: ACTIVE | COMMUNITY
Start: 2017-03-15

## 2023-09-29 RX ORDER — CARBIDOPA AND LEVODOPA 25; 100 MG/1; MG/1
25-100 TABLET ORAL 3 TIMES DAILY
Refills: 0 | Status: ACTIVE | COMMUNITY
Start: 2021-06-25

## 2023-09-29 RX ORDER — ASPIRIN 81 MG
81 TABLET, DELAYED RELEASE (ENTERIC COATED) ORAL DAILY
Refills: 0 | Status: ACTIVE | COMMUNITY

## 2023-09-29 RX ORDER — OMEPRAZOLE 40 MG/1
40 CAPSULE, DELAYED RELEASE ORAL
Qty: 30 | Refills: 3 | Status: DISCONTINUED | COMMUNITY
Start: 2023-09-14 | End: 2023-09-29

## 2023-09-29 RX ORDER — CYANOCOBALAMIN (VITAMIN B-12) 1000 MCG
1000 TABLET ORAL DAILY
Refills: 0 | Status: ACTIVE | COMMUNITY

## 2023-09-29 RX ORDER — TRIAMCINOLONE ACETONIDE 1 MG/G
0.1 CREAM TOPICAL
Qty: 80 | Refills: 0 | Status: DISCONTINUED | COMMUNITY
Start: 2017-05-30 | End: 2023-09-29

## 2023-09-29 RX ORDER — AMLODIPINE BESYLATE 5 MG/1
5 TABLET ORAL
Qty: 30 | Refills: 0 | Status: DISCONTINUED | COMMUNITY
Start: 2017-06-08 | End: 2023-09-29

## 2023-09-29 RX ORDER — LISINOPRIL AND HYDROCHLOROTHIAZIDE TABLETS 20; 12.5 MG/1; MG/1
20-12.5 TABLET ORAL
Qty: 30 | Refills: 0 | Status: DISCONTINUED | COMMUNITY
Start: 2018-06-12 | End: 2023-09-29

## 2023-09-29 RX ORDER — IPRATROPIUM BROMIDE 21 UG/1
0.03 SPRAY NASAL
Qty: 1 | Refills: 1 | Status: DISCONTINUED | COMMUNITY
Start: 2021-08-12 | End: 2023-09-29

## 2023-09-29 RX ORDER — CLOPIDOGREL BISULFATE 75 MG/1
75 TABLET, FILM COATED ORAL
Qty: 30 | Refills: 0 | Status: DISCONTINUED | COMMUNITY
Start: 2018-06-27 | End: 2023-09-29

## 2023-10-05 ENCOUNTER — APPOINTMENT (OUTPATIENT)
Dept: OTOLARYNGOLOGY | Facility: CLINIC | Age: 73
End: 2023-10-05
Payer: MEDICARE

## 2023-10-05 VITALS
WEIGHT: 185 LBS | DIASTOLIC BLOOD PRESSURE: 76 MMHG | HEIGHT: 70 IN | TEMPERATURE: 97 F | OXYGEN SATURATION: 100 % | HEART RATE: 71 BPM | SYSTOLIC BLOOD PRESSURE: 125 MMHG | BODY MASS INDEX: 26.48 KG/M2

## 2023-10-05 DIAGNOSIS — G20.A1 PARKINSON'S DISEASE WITHOUT DYSKINESIA, WITHOUT MENTION OF FLUCTUATIONS: ICD-10-CM

## 2023-10-05 DIAGNOSIS — R49.8 OTHER VOICE AND RESONANCE DISORDERS: ICD-10-CM

## 2023-10-05 DIAGNOSIS — K11.7 DISTURBANCES OF SALIVARY SECRETION: ICD-10-CM

## 2023-10-05 PROCEDURE — 99214 OFFICE O/P EST MOD 30 MIN: CPT

## 2023-10-11 ENCOUNTER — APPOINTMENT (OUTPATIENT)
Dept: NUCLEAR MEDICINE | Facility: HOSPITAL | Age: 73
End: 2023-10-11
Payer: MEDICARE

## 2023-10-11 ENCOUNTER — OUTPATIENT (OUTPATIENT)
Dept: OUTPATIENT SERVICES | Facility: HOSPITAL | Age: 73
LOS: 1 days | End: 2023-10-11
Payer: MEDICARE

## 2023-10-11 PROCEDURE — 78803 RP LOCLZJ TUM SPECT 1 AREA: CPT

## 2023-10-11 PROCEDURE — 78803 RP LOCLZJ TUM SPECT 1 AREA: CPT | Mod: 26,MH

## 2023-10-11 PROCEDURE — A9584: CPT

## 2023-10-13 ENCOUNTER — NON-APPOINTMENT (OUTPATIENT)
Age: 73
End: 2023-10-13

## 2023-10-20 ENCOUNTER — NON-APPOINTMENT (OUTPATIENT)
Age: 73
End: 2023-10-20

## 2023-10-20 PROCEDURE — G2012 BRIEF CHECK IN BY MD/QHP: CPT | Mod: NC

## 2023-10-21 ENCOUNTER — TRANSCRIPTION ENCOUNTER (OUTPATIENT)
Age: 73
End: 2023-10-21

## 2023-11-27 ENCOUNTER — APPOINTMENT (OUTPATIENT)
Dept: OTOLARYNGOLOGY | Facility: CLINIC | Age: 73
End: 2023-11-27

## 2023-12-11 ENCOUNTER — RX RENEWAL (OUTPATIENT)
Age: 73
End: 2023-12-11

## 2024-01-03 ENCOUNTER — APPOINTMENT (OUTPATIENT)
Dept: OTOLARYNGOLOGY | Facility: CLINIC | Age: 74
End: 2024-01-03
Payer: MEDICARE

## 2024-01-03 VITALS
OXYGEN SATURATION: 100 % | DIASTOLIC BLOOD PRESSURE: 81 MMHG | SYSTOLIC BLOOD PRESSURE: 129 MMHG | HEART RATE: 57 BPM | HEIGHT: 70 IN | TEMPERATURE: 97.8 F | BODY MASS INDEX: 26.48 KG/M2 | WEIGHT: 185 LBS

## 2024-01-03 DIAGNOSIS — R49.8 OTHER VOICE AND RESONANCE DISORDERS: ICD-10-CM

## 2024-01-03 DIAGNOSIS — J04.0 ACUTE LARYNGITIS: ICD-10-CM

## 2024-01-03 DIAGNOSIS — K21.9 ACUTE LARYNGITIS: ICD-10-CM

## 2024-01-03 PROCEDURE — 99212 OFFICE O/P EST SF 10 MIN: CPT | Mod: 25

## 2024-01-03 PROCEDURE — 31575 DIAGNOSTIC LARYNGOSCOPY: CPT

## 2024-01-03 PROCEDURE — 99213 OFFICE O/P EST LOW 20 MIN: CPT | Mod: 25

## 2024-01-03 RX ORDER — PANTOPRAZOLE 40 MG/1
40 TABLET, DELAYED RELEASE ORAL
Qty: 90 | Refills: 1 | Status: ACTIVE | COMMUNITY
Start: 2024-01-03 | End: 1900-01-01

## 2024-01-03 NOTE — PHYSICAL EXAM
[Laryngoscopy Performed] : laryngoscopy was performed, see procedure section for findings [Normal] : no neck adenopathy [de-identified] : gait steady

## 2024-01-03 NOTE — ASSESSMENT
[FreeTextEntry1] : reflux laryngitis continue diet mech precautions, pantoprazole renewed has neuro appt in near future drooling likely related to Pakinson's-like dz rtc 3 mo

## 2024-01-03 NOTE — HISTORY OF PRESENT ILLNESS
[de-identified] : 3 mo follow up appt for this 74 yo m with atypical Parkinson's features - has neuro appt pending. He said his own md is going to rectify this with botox. He has felt better on pantoprazole 40 mg/d diet and mech precautions. He also has vocal tremor.

## 2024-01-03 NOTE — PROCEDURE
[Hoarseness] : hoarseness not clearly evaluated by indirect laryngoscopy [Topical Lidocaine] : topical lidocaine [Oxymetazoline HCl] : oxymetazoline HCl [Flexible Endoscope] : examined with the flexible endoscope [Serial Number: ___] : Serial Number: [unfilled] [Normal] : the false vocal folds were pink and regular, the ventricular sulcus was open, the true vocal folds were glistening white, tense and of equal length, mobility, and height [Interarytenoid Edema] : interarytenoid area edematous [de-identified] : done for reflux, vocal tremor - iah mild

## 2024-01-08 ENCOUNTER — TRANSCRIPTION ENCOUNTER (OUTPATIENT)
Age: 74
End: 2024-01-08

## 2024-01-19 ENCOUNTER — APPOINTMENT (OUTPATIENT)
Dept: NEUROLOGY | Facility: CLINIC | Age: 74
End: 2024-01-19
Payer: MEDICARE

## 2024-01-19 VITALS
HEART RATE: 68 BPM | SYSTOLIC BLOOD PRESSURE: 131 MMHG | OXYGEN SATURATION: 99 % | HEIGHT: 68.9 IN | DIASTOLIC BLOOD PRESSURE: 79 MMHG | TEMPERATURE: 97.8 F

## 2024-01-19 VITALS
BODY MASS INDEX: 26.36 KG/M2 | HEART RATE: 74 BPM | OXYGEN SATURATION: 99 % | WEIGHT: 178 LBS | DIASTOLIC BLOOD PRESSURE: 72 MMHG | SYSTOLIC BLOOD PRESSURE: 130 MMHG

## 2024-01-19 DIAGNOSIS — K11.7 DISTURBANCES OF SALIVARY SECRETION: ICD-10-CM

## 2024-01-19 PROCEDURE — 99215 OFFICE O/P EST HI 40 MIN: CPT

## 2024-01-19 NOTE — DISCUSSION/SUMMARY
[FreeTextEntry1] :  is a 73 year old RH gentleman here for second opinion for Parkinsons ds. Symptoms started two years back with progressive hoarseness of voice and balance problems. Denies any rest tremor. On exam noted to have square wave jerks, restricted vertical gaze, dysarthria and bradykinesia. Clinical suspicion for Atypical Parkinsonism. BILL scan also reduced uptake in the right caudate > left, no activity on putamen either side. plan to continue the same dose regime of LD 2 tab three times a day     Plan - Continue LD 2 tab three times a day  - for drooling try atropine solution  - PT/OT SLP therapy - Discussed [facility]  RTC in 3-4 months with Oksana Perry NP  Patient seen and examined with attending Dr.Di Yung Bagley MD Movement Disorder Fellow

## 2024-01-19 NOTE — HISTORY OF PRESENT ILLNESS
[FreeTextEntry1] : Patient is a 73 year old RH male here for follow up for Parkinsonism / PSP  Since last visit : had the BILL scan which was abnormal, reduced activity in rt caudate than left had Covid infection in October, recovered well from that now.  Denies any dysphagia since last visit. complains of worsening gait with shuffling and freezing. Complains of increased drooling since last visit.  Patient was diagnosed with Parkinsons ds by Wesley Levine at [facility] in Aug2022, here for second opinion. Patient states symptoms started two years back with initial symptoms of changes in voice and hoarseness. The hoarseness seems to be progressing for the last two years. He denies any dysphagia to solid and liquid food. He denies any difficulty breathing.  Patient also complains of balance problems for the last two years progressively getting worse. He had two falls in the last one year. he states he walks slowing and occasionally freezes. he tends to fall backwards. has trouble going up and down the stairs. he complains of dizziness and lightheadedness when he gets up from sitting position. Currently walks without assistance. he has never noticed a tremor. Currently doing PT three times a week non motor Patient states he has good sense of smell. Denies any concerns for constipation, has a BM once a day. Sleeps well, Denies any RBD symptoms. wakes up in the middle of night to go to the bathroom, occasional trouble turning in bed.  In terms of mood he states he gets anxious too soon. memory is good. able to manage all ADL including finances, does not drive. Handwriting is getting smaller. Denies any hallucinations. he complains of drooling, which has been a big concern for them.  Continue s have wht loss  Imaging planning to get MRI brain on Monday 10/2   Medications Levodopa -[id number]: 2tab three times [ 9:30--1:45pm--9p] kinetics: unable to tell metformin allopurinol Lisonopril Fish oil Magnesium aspirin B12   exercise; stationary biking two times a week PT three times a week   Family Hx mom: stomach cancer dad : leukemia sister: leukemia   Social Hx occasionally drinks teacher [ ] hx of consanguineous marriages in family

## 2024-01-19 NOTE — END OF VISIT
[] : Fellow [FreeTextEntry3] : Fellow present and participated to visit, Patient's history reviewed with fellow, patient examined with fellow, assessment and plan discussed with fellow.

## 2024-01-19 NOTE — PHYSICAL EXAM
[Heart Rate And Rhythm] : heart rate was normal and rhythm regular [Heart Sounds] : normal S1 and S2 [Heart Sounds Gallop] : no gallops [Murmurs] : no murmurs [Heart Sounds Pericardial Friction Rub] : no pericardial rub [Full Pulse] : the pedal pulses are present [Edema] : there was no peripheral edema [Bowel Sounds] : normal bowel sounds [Abdomen Soft] : soft [Abdomen Tenderness] : non-tender [] : no hepato-splenomegaly [Abdomen Mass (___ Cm)] : no abdominal mass palpated [No CVA Tenderness] : no ~M costovertebral angle tenderness [No Spinal Tenderness] : no spinal tenderness [FreeTextEntry1] : awake, alert oriented to time place situation speech /language: dysarthria noted, comprehension, repition , naming intact cranial nerve: EOMI, slow broken saccades. square wave jerks noted, vertical gaze restriction noted blink rate reduced motor bradykinesia grade1 left > rt rigidit not appreciated no rest tremor noted sensory  crude sensation intact pin prick reduced on left toe, but improved ankle upwards  reflexes patella1+ ankle 1+  gait reduced arm swing short stride length

## 2024-01-24 ENCOUNTER — TRANSCRIPTION ENCOUNTER (OUTPATIENT)
Age: 74
End: 2024-01-24

## 2024-01-26 ENCOUNTER — TRANSCRIPTION ENCOUNTER (OUTPATIENT)
Age: 74
End: 2024-01-26

## 2024-02-05 ENCOUNTER — TRANSCRIPTION ENCOUNTER (OUTPATIENT)
Age: 74
End: 2024-02-05

## 2024-02-19 ENCOUNTER — RX RENEWAL (OUTPATIENT)
Age: 74
End: 2024-02-19

## 2024-03-06 RX ORDER — ATROPINE SULFATE 0.01 %
0.01 DROPS, EMULSION OPHTHALMIC (EYE)
Qty: 1 | Refills: 11 | Status: ACTIVE | COMMUNITY
Start: 2024-01-19 | End: 1900-01-01

## 2024-04-03 ENCOUNTER — APPOINTMENT (OUTPATIENT)
Dept: OTOLARYNGOLOGY | Facility: CLINIC | Age: 74
End: 2024-04-03

## 2024-04-22 ENCOUNTER — RX RENEWAL (OUTPATIENT)
Age: 74
End: 2024-04-22

## 2024-04-22 RX ORDER — OMEPRAZOLE 40 MG/1
40 CAPSULE, DELAYED RELEASE ORAL
Qty: 30 | Refills: 0 | Status: ACTIVE | COMMUNITY
Start: 2024-02-21 | End: 1900-01-01

## 2024-04-24 ENCOUNTER — APPOINTMENT (OUTPATIENT)
Dept: NEUROLOGY | Facility: CLINIC | Age: 74
End: 2024-04-24

## 2024-05-22 ENCOUNTER — NON-APPOINTMENT (OUTPATIENT)
Age: 74
End: 2024-05-22

## 2024-05-23 ENCOUNTER — RX RENEWAL (OUTPATIENT)
Age: 74
End: 2024-05-23

## 2024-08-07 ENCOUNTER — APPOINTMENT (OUTPATIENT)
Dept: NEUROLOGY | Facility: CLINIC | Age: 74
End: 2024-08-07